# Patient Record
Sex: MALE | HISPANIC OR LATINO | URBAN - METROPOLITAN AREA
[De-identification: names, ages, dates, MRNs, and addresses within clinical notes are randomized per-mention and may not be internally consistent; named-entity substitution may affect disease eponyms.]

---

## 2016-07-29 RX ORDER — GLYBURIDE 5 MG
1 TABLET ORAL
Qty: 0 | Refills: 0 | COMMUNITY
Start: 2016-07-29

## 2017-03-01 VITALS
TEMPERATURE: 98 F | OXYGEN SATURATION: 95 % | DIASTOLIC BLOOD PRESSURE: 67 MMHG | HEIGHT: 68 IN | WEIGHT: 240.08 LBS | RESPIRATION RATE: 16 BRPM | HEART RATE: 72 BPM | SYSTOLIC BLOOD PRESSURE: 148 MMHG

## 2017-03-01 NOTE — PRE-OP CHECKLIST - SELECT TESTS ORDERED
nares dos/EKG/CXR/PT/PTT/CBC/CMP/Urinalysis sravanthi dos, t& cross dos/Urinalysis/EKG/CMP/PT/PTT/CXR/CBC

## 2017-03-01 NOTE — H&P ADULT - NSHPPHYSICALEXAM_GEN_ALL_CORE
MSK  +Decreased ROM secondary to pain, left knee    Remainder of exam per medical clearance note MSK:  +Decreased ROM secondary to pain, left knee  Quad/GS/TA/FHL/EHL motor strength 5/5 bilaterally  Sensation intact and equal to distal bilateral LE.   DP pulses palpable, toes warm and well perfused, cap refill brisk.     Remainder of exam per medical clearance note

## 2017-03-01 NOTE — PATIENT PROFILE ADULT. - PSH
H/O arthroscopy of right knee  2014  S/P arthroscopic surgery of left knee  2014  S/P hemorrhoidectomy  1980  S/P laparoscopic cholecystectomy  5/13/2015 H/O arthroscopy of right knee  2014  H/O total knee replacement, right  7/2016  S/P arthroscopic surgery of left knee  2014  S/P hemorrhoidectomy  1980  S/P laparoscopic cholecystectomy  5/13/2015

## 2017-03-01 NOTE — PATIENT PROFILE ADULT. - TEACHING/LEARNING LEARNING PREFERENCES
individual instruction/verbal instruction verbal instruction/written material/individual instruction

## 2017-03-01 NOTE — H&P ADULT - PROBLEM SELECTOR PLAN 1
Admit to Orthopaedic Service.  Presents today for elective left TKR  Pt medically stable and cleared for procedure today by Dr. Sargent

## 2017-03-01 NOTE — H&P ADULT - NSHPLABSRESULTS_GEN_ALL_CORE
Preop CBC, BMP, PT/PTT/INR, UA - WNL per medical clearance   Nasal Swab DOS  Preop EKG - left axis deviation - WNL per medical clearance   Preop CXR - WNL per medical clearance   Nuclear Stress Test 7/12/16 Normal left ventricular contractility, normal SPECT, no evidence of myocardial ischemia Preop CBC, BMP, PT/PTT/INR, UA - WNL per medical clearance   Nasal Swab performed DOS  Preop EKG - left axis deviation - WNL per medical clearance   Preop CXR - WNL per medical clearance   Nuclear Stress Test 7/12/16 Normal left ventricular contractility, normal SPECT, no evidence of myocardial ischemia

## 2017-03-01 NOTE — H&P ADULT - HISTORY OF PRESENT ILLNESS
66M c/o left knee pain x       Elective left total knee replacement today 66M c/o left knee pain x 5 years s/p mechanical fall at work in 2012. Pt's pain has progressively worsened without improvement and has become increasingly unresponsive to past conservative management including physical therapy, cortisone injection (x1) and arthroscopic surgery. Pain is non-radiating. Pt ambulates with the use of a cane s/p right TKR in July 2016. Denies numbness/tingling/weakness of extremities. Denies fever, chills, CP, SOB, N/V today.     Elective left total knee replacement today 66M c/o left knee pain x 5 years s/p mechanical fall at work in 2012. Pt's pain has progressively worsened without improvement and has become increasingly unresponsive to past conservative management including physical therapy, cortisone injection (x1) and arthroscopic surgery. Pain is non-radiating. Pt ambulates with the use of a cane s/p right TKR in July 2016. Denies numbness/tingling/weakness of extremities. Denies h/o DVT. Denies fever, chills, CP, SOB, N/V today.     Elective left total knee replacement today

## 2017-03-02 ENCOUNTER — INPATIENT (INPATIENT)
Facility: HOSPITAL | Age: 67
LOS: 3 days | Discharge: HOME HEALTH | DRG: 470 | End: 2017-03-06
Attending: ORTHOPAEDIC SURGERY | Admitting: ORTHOPAEDIC SURGERY
Payer: OTHER MISCELLANEOUS

## 2017-03-02 DIAGNOSIS — Z96.651 PRESENCE OF RIGHT ARTIFICIAL KNEE JOINT: Chronic | ICD-10-CM

## 2017-03-02 DIAGNOSIS — Z98.89 OTHER SPECIFIED POSTPROCEDURAL STATES: Chronic | ICD-10-CM

## 2017-03-02 DIAGNOSIS — M17.12 UNILATERAL PRIMARY OSTEOARTHRITIS, LEFT KNEE: ICD-10-CM

## 2017-03-02 DIAGNOSIS — Z90.49 ACQUIRED ABSENCE OF OTHER SPECIFIED PARTS OF DIGESTIVE TRACT: Chronic | ICD-10-CM

## 2017-03-02 LAB
ANION GAP SERPL CALC-SCNC: 7 MMOL/L — LOW (ref 9–16)
BUN SERPL-MCNC: 13 MG/DL — SIGNIFICANT CHANGE UP (ref 7–23)
CALCIUM SERPL-MCNC: 8.5 MG/DL — SIGNIFICANT CHANGE UP (ref 8.5–10.5)
CHLORIDE SERPL-SCNC: 108 MMOL/L — SIGNIFICANT CHANGE UP (ref 96–108)
CO2 SERPL-SCNC: 25 MMOL/L — SIGNIFICANT CHANGE UP (ref 22–31)
CREAT SERPL-MCNC: 0.72 MG/DL — SIGNIFICANT CHANGE UP (ref 0.5–1.3)
GLUCOSE SERPL-MCNC: 195 MG/DL — HIGH (ref 70–99)
HCT VFR BLD CALC: 39.8 % — SIGNIFICANT CHANGE UP (ref 39–50)
HGB BLD-MCNC: 13.6 G/DL — SIGNIFICANT CHANGE UP (ref 13–17)
MCHC RBC-ENTMCNC: 28.3 PG — SIGNIFICANT CHANGE UP (ref 27–34)
MCHC RBC-ENTMCNC: 34.2 G/DL — SIGNIFICANT CHANGE UP (ref 32–36)
MCV RBC AUTO: 82.9 FL — SIGNIFICANT CHANGE UP (ref 80–100)
PLATELET # BLD AUTO: 177 K/UL — SIGNIFICANT CHANGE UP (ref 150–400)
POTASSIUM SERPL-MCNC: 4.1 MMOL/L — SIGNIFICANT CHANGE UP (ref 3.5–5.3)
POTASSIUM SERPL-SCNC: 4.1 MMOL/L — SIGNIFICANT CHANGE UP (ref 3.5–5.3)
RBC # BLD: 4.8 M/UL — SIGNIFICANT CHANGE UP (ref 4.2–5.8)
RBC # FLD: 13.1 % — SIGNIFICANT CHANGE UP (ref 10.3–16.9)
SODIUM SERPL-SCNC: 140 MMOL/L — SIGNIFICANT CHANGE UP (ref 135–145)
WBC # BLD: 7.4 K/UL — SIGNIFICANT CHANGE UP (ref 3.8–10.5)
WBC # FLD AUTO: 7.4 K/UL — SIGNIFICANT CHANGE UP (ref 3.8–10.5)

## 2017-03-02 PROCEDURE — 27447 TOTAL KNEE ARTHROPLASTY: CPT | Mod: AS

## 2017-03-02 RX ORDER — CELECOXIB 200 MG/1
200 CAPSULE ORAL
Qty: 0 | Refills: 0 | Status: DISCONTINUED | OUTPATIENT
Start: 2017-03-04 | End: 2017-03-06

## 2017-03-02 RX ORDER — DEXTROSE 50 % IN WATER 50 %
25 SYRINGE (ML) INTRAVENOUS ONCE
Qty: 0 | Refills: 0 | Status: DISCONTINUED | OUTPATIENT
Start: 2017-03-02 | End: 2017-03-06

## 2017-03-02 RX ORDER — FERROUS SULFATE 325(65) MG
325 TABLET ORAL
Qty: 0 | Refills: 0 | Status: DISCONTINUED | OUTPATIENT
Start: 2017-03-02 | End: 2017-03-06

## 2017-03-02 RX ORDER — ASCORBIC ACID 60 MG
500 TABLET,CHEWABLE ORAL
Qty: 0 | Refills: 0 | Status: DISCONTINUED | OUTPATIENT
Start: 2017-03-02 | End: 2017-03-06

## 2017-03-02 RX ORDER — SENNA PLUS 8.6 MG/1
2 TABLET ORAL AT BEDTIME
Qty: 0 | Refills: 0 | Status: DISCONTINUED | OUTPATIENT
Start: 2017-03-02 | End: 2017-03-03

## 2017-03-02 RX ORDER — OXYCODONE HYDROCHLORIDE 5 MG/1
5 TABLET ORAL EVERY 4 HOURS
Qty: 0 | Refills: 0 | Status: DISCONTINUED | OUTPATIENT
Start: 2017-03-02 | End: 2017-03-06

## 2017-03-02 RX ORDER — FOLIC ACID 0.8 MG
1 TABLET ORAL DAILY
Qty: 0 | Refills: 0 | Status: DISCONTINUED | OUTPATIENT
Start: 2017-03-02 | End: 2017-03-06

## 2017-03-02 RX ORDER — DEXTROSE 50 % IN WATER 50 %
25 SYRINGE (ML) INTRAVENOUS ONCE
Qty: 0 | Refills: 0 | Status: DISCONTINUED | OUTPATIENT
Start: 2017-03-02 | End: 2017-03-04

## 2017-03-02 RX ORDER — ASPIRIN/CALCIUM CARB/MAGNESIUM 324 MG
325 TABLET ORAL
Qty: 0 | Refills: 0 | Status: DISCONTINUED | OUTPATIENT
Start: 2017-03-02 | End: 2017-03-06

## 2017-03-02 RX ORDER — INSULIN LISPRO 100/ML
3 VIAL (ML) SUBCUTANEOUS ONCE
Qty: 0 | Refills: 0 | Status: DISCONTINUED | OUTPATIENT
Start: 2017-03-02 | End: 2017-03-02

## 2017-03-02 RX ORDER — SODIUM CHLORIDE 9 MG/ML
1000 INJECTION, SOLUTION INTRAVENOUS
Qty: 0 | Refills: 0 | Status: DISCONTINUED | OUTPATIENT
Start: 2017-03-02 | End: 2017-03-04

## 2017-03-02 RX ORDER — ONDANSETRON 8 MG/1
4 TABLET, FILM COATED ORAL EVERY 6 HOURS
Qty: 0 | Refills: 0 | Status: DISCONTINUED | OUTPATIENT
Start: 2017-03-02 | End: 2017-03-06

## 2017-03-02 RX ORDER — LOSARTAN POTASSIUM 100 MG/1
100 TABLET, FILM COATED ORAL DAILY
Qty: 0 | Refills: 0 | Status: DISCONTINUED | OUTPATIENT
Start: 2017-03-02 | End: 2017-03-06

## 2017-03-02 RX ORDER — MORPHINE SULFATE 50 MG/1
4 CAPSULE, EXTENDED RELEASE ORAL
Qty: 0 | Refills: 0 | Status: DISCONTINUED | OUTPATIENT
Start: 2017-03-02 | End: 2017-03-03

## 2017-03-02 RX ORDER — SIMVASTATIN 20 MG/1
5 TABLET, FILM COATED ORAL AT BEDTIME
Qty: 0 | Refills: 0 | Status: DISCONTINUED | OUTPATIENT
Start: 2017-03-02 | End: 2017-03-06

## 2017-03-02 RX ORDER — DEXTROSE 50 % IN WATER 50 %
1 SYRINGE (ML) INTRAVENOUS ONCE
Qty: 0 | Refills: 0 | Status: DISCONTINUED | OUTPATIENT
Start: 2017-03-02 | End: 2017-03-04

## 2017-03-02 RX ORDER — DOCUSATE SODIUM 100 MG
100 CAPSULE ORAL THREE TIMES A DAY
Qty: 0 | Refills: 0 | Status: DISCONTINUED | OUTPATIENT
Start: 2017-03-02 | End: 2017-03-06

## 2017-03-02 RX ORDER — INSULIN LISPRO 100/ML
VIAL (ML) SUBCUTANEOUS
Qty: 0 | Refills: 0 | Status: DISCONTINUED | OUTPATIENT
Start: 2017-03-02 | End: 2017-03-04

## 2017-03-02 RX ORDER — MAGNESIUM HYDROXIDE 400 MG/1
30 TABLET, CHEWABLE ORAL DAILY
Qty: 0 | Refills: 0 | Status: DISCONTINUED | OUTPATIENT
Start: 2017-03-02 | End: 2017-03-06

## 2017-03-02 RX ORDER — SODIUM CHLORIDE 9 MG/ML
1000 INJECTION, SOLUTION INTRAVENOUS
Qty: 0 | Refills: 0 | Status: DISCONTINUED | OUTPATIENT
Start: 2017-03-02 | End: 2017-03-03

## 2017-03-02 RX ORDER — MORPHINE SULFATE 50 MG/1
4 CAPSULE, EXTENDED RELEASE ORAL EVERY 4 HOURS
Qty: 0 | Refills: 0 | Status: DISCONTINUED | OUTPATIENT
Start: 2017-03-02 | End: 2017-03-06

## 2017-03-02 RX ORDER — INSULIN HUMAN 100 [IU]/ML
3 INJECTION, SOLUTION SUBCUTANEOUS ONCE
Qty: 0 | Refills: 0 | Status: DISCONTINUED | OUTPATIENT
Start: 2017-03-02 | End: 2017-03-02

## 2017-03-02 RX ORDER — CEFAZOLIN SODIUM 1 G
1000 VIAL (EA) INJECTION EVERY 8 HOURS
Qty: 0 | Refills: 0 | Status: COMPLETED | OUTPATIENT
Start: 2017-03-02 | End: 2017-03-02

## 2017-03-02 RX ORDER — ACETAMINOPHEN 500 MG
650 TABLET ORAL EVERY 6 HOURS
Qty: 0 | Refills: 0 | Status: DISCONTINUED | OUTPATIENT
Start: 2017-03-02 | End: 2017-03-06

## 2017-03-02 RX ORDER — INSULIN LISPRO 100/ML
VIAL (ML) SUBCUTANEOUS AT BEDTIME
Qty: 0 | Refills: 0 | Status: DISCONTINUED | OUTPATIENT
Start: 2017-03-02 | End: 2017-03-03

## 2017-03-02 RX ORDER — BUPIVACAINE 13.3 MG/ML
20 INJECTION, SUSPENSION, LIPOSOMAL INFILTRATION ONCE
Qty: 0 | Refills: 0 | Status: DISCONTINUED | OUTPATIENT
Start: 2017-03-02 | End: 2017-03-06

## 2017-03-02 RX ORDER — DEXTROSE 50 % IN WATER 50 %
12.5 SYRINGE (ML) INTRAVENOUS ONCE
Qty: 0 | Refills: 0 | Status: DISCONTINUED | OUTPATIENT
Start: 2017-03-02 | End: 2017-03-04

## 2017-03-02 RX ORDER — GLUCAGON INJECTION, SOLUTION 0.5 MG/.1ML
1 INJECTION, SOLUTION SUBCUTANEOUS ONCE
Qty: 0 | Refills: 0 | Status: DISCONTINUED | OUTPATIENT
Start: 2017-03-02 | End: 2017-03-04

## 2017-03-02 RX ORDER — POLYETHYLENE GLYCOL 3350 17 G/17G
17 POWDER, FOR SOLUTION ORAL DAILY
Qty: 0 | Refills: 0 | Status: DISCONTINUED | OUTPATIENT
Start: 2017-03-02 | End: 2017-03-06

## 2017-03-02 RX ORDER — OXYCODONE HYDROCHLORIDE 5 MG/1
10 TABLET ORAL EVERY 6 HOURS
Qty: 0 | Refills: 0 | Status: DISCONTINUED | OUTPATIENT
Start: 2017-03-02 | End: 2017-03-03

## 2017-03-02 RX ADMIN — OXYCODONE HYDROCHLORIDE 5 MILLIGRAM(S): 5 TABLET ORAL at 23:16

## 2017-03-02 RX ADMIN — SIMVASTATIN 5 MILLIGRAM(S): 20 TABLET, FILM COATED ORAL at 21:56

## 2017-03-02 RX ADMIN — Medication 100 MILLIGRAM(S): at 23:16

## 2017-03-02 RX ADMIN — Medication 1 TABLET(S): at 17:40

## 2017-03-02 RX ADMIN — Medication 1 MILLIGRAM(S): at 17:40

## 2017-03-02 RX ADMIN — MORPHINE SULFATE 4 MILLIGRAM(S): 50 CAPSULE, EXTENDED RELEASE ORAL at 13:30

## 2017-03-02 RX ADMIN — Medication 100 MILLIGRAM(S): at 16:09

## 2017-03-02 RX ADMIN — Medication: at 12:16

## 2017-03-02 RX ADMIN — SODIUM CHLORIDE 80 MILLILITER(S): 9 INJECTION, SOLUTION INTRAVENOUS at 23:16

## 2017-03-02 RX ADMIN — OXYCODONE HYDROCHLORIDE 10 MILLIGRAM(S): 5 TABLET ORAL at 17:00

## 2017-03-02 RX ADMIN — MORPHINE SULFATE 4 MILLIGRAM(S): 50 CAPSULE, EXTENDED RELEASE ORAL at 14:15

## 2017-03-02 RX ADMIN — Medication 500 MILLIGRAM(S): at 17:37

## 2017-03-02 RX ADMIN — MORPHINE SULFATE 4 MILLIGRAM(S): 50 CAPSULE, EXTENDED RELEASE ORAL at 13:14

## 2017-03-02 RX ADMIN — MORPHINE SULFATE 4 MILLIGRAM(S): 50 CAPSULE, EXTENDED RELEASE ORAL at 14:30

## 2017-03-02 RX ADMIN — Medication 1 TABLET(S): at 23:16

## 2017-03-02 RX ADMIN — MORPHINE SULFATE 4 MILLIGRAM(S): 50 CAPSULE, EXTENDED RELEASE ORAL at 18:00

## 2017-03-02 RX ADMIN — OXYCODONE HYDROCHLORIDE 10 MILLIGRAM(S): 5 TABLET ORAL at 16:09

## 2017-03-02 RX ADMIN — Medication 1: at 17:38

## 2017-03-02 RX ADMIN — MORPHINE SULFATE 4 MILLIGRAM(S): 50 CAPSULE, EXTENDED RELEASE ORAL at 17:37

## 2017-03-02 RX ADMIN — Medication 325 MILLIGRAM(S): at 17:41

## 2017-03-02 RX ADMIN — Medication 100 MILLIGRAM(S): at 21:56

## 2017-03-02 NOTE — PHYSICAL THERAPY INITIAL EVALUATION ADULT - PERTINENT HX OF CURRENT PROBLEM, REHAB EVAL
Patient is a 67 y/o M with chief c/o of chronic knee pain progressively worsening x 5 years following work related injury presenting for elective TKR.

## 2017-03-02 NOTE — PHYSICAL THERAPY INITIAL EVALUATION ADULT - IMPAIRMENTS FOUND, PT EVAL
ROM/aerobic capacity/endurance/gait, locomotion, and balance/posture/ergonomics and body mechanics/muscle strength

## 2017-03-02 NOTE — PHYSICAL THERAPY INITIAL EVALUATION ADULT - PLANNED THERAPY INTERVENTIONS, PT EVAL
gait training/strengthening/ROM/bed mobility training/transfer training/balance training/postural re-education

## 2017-03-02 NOTE — PHYSICAL THERAPY INITIAL EVALUATION ADULT - CRITERIA FOR SKILLED THERAPEUTIC INTERVENTIONS
anticipated discharge recommendation/risk reduction/prevention/functional limitations in following categories/therapy frequency/impairments found/rehab potential/predicted duration of therapy intervention/anticipated equipment needs at discharge

## 2017-03-02 NOTE — PHYSICAL THERAPY INITIAL EVALUATION ADULT - RANGE OF MOTION EXAMINATION, REHAB EVAL
Right LE ROM was WNL (within normal limits)/bilateral upper extremity ROM was WNL (within normal limits)

## 2017-03-02 NOTE — PROGRESS NOTE ADULT - SUBJECTIVE AND OBJECTIVE BOX
Ortho Post Op Check    Procedure: Left total knee replacement  Surgeon: Dr. Sumner    Pt comfortable without complaints, pain well controlled  Denies CP, SOB, N/V, numbness/tingling of extremities    Vital Signs Last 24 Hrs  T(C): 36.8, Max: 36.8 (03-02 @ 13:00)  T(F): 98.2, Max: 98.2 (03-02 @ 13:00)  HR: 67 (55 - 67)  BP: 111/61 (98/61 - 115/63)  BP(mean): --  RR: 15 (11 - 22)  SpO2: 100% (96% - 100%)  Wt(kg): --  AVSS    General: Pt Alert and oriented, NAD  DSG C/D/I, icepack on wound  Pulses: DP pulses palpable, toes warm and well perfused, cap refill brisk  Sensation: sensation intact and equal to distal BLE  Motor: EHL/FHL/TA/GS 5/5 strength BLE          Post-op X-Ray: s/p left TKR, prosthesis in place    A/P: 66yMale POD#0 s/p left total knee replacement  - Stable  - Pain Control  - DVT ppx: ASA  - Post op abx: Ancef  - PT, WBS: WBAT    Ortho Pager [phone] Ortho Post Op Check    Procedure: Left total knee replacement  Surgeon: Dr. Sumner    Pt comfortable without complaints, pain well controlled  Denies CP, SOB, N/V, numbness/tingling of extremities    Vital Signs Last 24 Hrs  T(C): 36.8, Max: 36.8 (03-02 @ 13:00)  T(F): 98.2, Max: 98.2 (03-02 @ 13:00)  HR: 67 (55 - 67)  BP: 111/61 (98/61 - 115/63)  BP(mean): --  RR: 15 (11 - 22)  SpO2: 100% (96% - 100%)  Wt(kg): --  AVSS    General: Pt Alert and oriented, NAD  DSG C/D/I, cryocuff in place  Pulses: DP pulses palpable, toes warm and well perfused, cap refill brisk  Sensation: sensation intact and equal to distal BLE  Motor: EHL/FHL/TA/GS 5/5 strength BLE          Post-op X-Ray: s/p left TKR, prosthesis in place    A/P: 66yMale POD#0 s/p left total knee replacement  - Stable  - Pain Control  - DVT ppx: ASA  - Post op abx: Ancef  - PT, WBS: WBAT    Ortho Pager [phone]

## 2017-03-03 DIAGNOSIS — I10 ESSENTIAL (PRIMARY) HYPERTENSION: ICD-10-CM

## 2017-03-03 DIAGNOSIS — E87.1 HYPO-OSMOLALITY AND HYPONATREMIA: ICD-10-CM

## 2017-03-03 DIAGNOSIS — E11.9 TYPE 2 DIABETES MELLITUS WITHOUT COMPLICATIONS: ICD-10-CM

## 2017-03-03 DIAGNOSIS — M19.90 UNSPECIFIED OSTEOARTHRITIS, UNSPECIFIED SITE: ICD-10-CM

## 2017-03-03 DIAGNOSIS — Z98.890 OTHER SPECIFIED POSTPROCEDURAL STATES: ICD-10-CM

## 2017-03-03 DIAGNOSIS — M17.0 BILATERAL PRIMARY OSTEOARTHRITIS OF KNEE: ICD-10-CM

## 2017-03-03 LAB
ANION GAP SERPL CALC-SCNC: 8 MMOL/L — LOW (ref 9–16)
ANION GAP SERPL CALC-SCNC: 8 MMOL/L — LOW (ref 9–16)
BUN SERPL-MCNC: 10 MG/DL — SIGNIFICANT CHANGE UP (ref 7–23)
BUN SERPL-MCNC: 13 MG/DL — SIGNIFICANT CHANGE UP (ref 7–23)
CALCIUM SERPL-MCNC: 8.7 MG/DL — SIGNIFICANT CHANGE UP (ref 8.5–10.5)
CALCIUM SERPL-MCNC: 9.2 MG/DL — SIGNIFICANT CHANGE UP (ref 8.5–10.5)
CHLORIDE SERPL-SCNC: 91 MMOL/L — LOW (ref 96–108)
CHLORIDE SERPL-SCNC: 97 MMOL/L — SIGNIFICANT CHANGE UP (ref 96–108)
CO2 SERPL-SCNC: 25 MMOL/L — SIGNIFICANT CHANGE UP (ref 22–31)
CO2 SERPL-SCNC: 28 MMOL/L — SIGNIFICANT CHANGE UP (ref 22–31)
CREAT SERPL-MCNC: 0.65 MG/DL — SIGNIFICANT CHANGE UP (ref 0.5–1.3)
CREAT SERPL-MCNC: 0.84 MG/DL — SIGNIFICANT CHANGE UP (ref 0.5–1.3)
GLUCOSE SERPL-MCNC: 233 MG/DL — HIGH (ref 70–99)
GLUCOSE SERPL-MCNC: 317 MG/DL — HIGH (ref 70–99)
HBA1C BLD-MCNC: 9.1 % — HIGH (ref 4.8–5.6)
HCT VFR BLD CALC: 40.9 % — SIGNIFICANT CHANGE UP (ref 39–50)
HGB BLD-MCNC: 13.9 G/DL — SIGNIFICANT CHANGE UP (ref 13–17)
MCHC RBC-ENTMCNC: 27.8 PG — SIGNIFICANT CHANGE UP (ref 27–34)
MCHC RBC-ENTMCNC: 34 G/DL — SIGNIFICANT CHANGE UP (ref 32–36)
MCV RBC AUTO: 81.8 FL — SIGNIFICANT CHANGE UP (ref 80–100)
MRSA PCR RESULT.: NEGATIVE — SIGNIFICANT CHANGE UP
PLATELET # BLD AUTO: 199 K/UL — SIGNIFICANT CHANGE UP (ref 150–400)
POTASSIUM SERPL-MCNC: 3.8 MMOL/L — SIGNIFICANT CHANGE UP (ref 3.5–5.3)
POTASSIUM SERPL-MCNC: 4 MMOL/L — SIGNIFICANT CHANGE UP (ref 3.5–5.3)
POTASSIUM SERPL-SCNC: 3.8 MMOL/L — SIGNIFICANT CHANGE UP (ref 3.5–5.3)
POTASSIUM SERPL-SCNC: 4 MMOL/L — SIGNIFICANT CHANGE UP (ref 3.5–5.3)
RBC # BLD: 5 M/UL — SIGNIFICANT CHANGE UP (ref 4.2–5.8)
RBC # FLD: 13 % — SIGNIFICANT CHANGE UP (ref 10.3–16.9)
S AUREUS DNA NOSE QL NAA+PROBE: NEGATIVE — SIGNIFICANT CHANGE UP
SODIUM SERPL-SCNC: 127 MMOL/L — LOW (ref 135–145)
SODIUM SERPL-SCNC: 130 MMOL/L — LOW (ref 135–145)
SURGICAL PATHOLOGY STUDY: SIGNIFICANT CHANGE UP
WBC # BLD: 11.5 K/UL — HIGH (ref 3.8–10.5)
WBC # FLD AUTO: 11.5 K/UL — HIGH (ref 3.8–10.5)

## 2017-03-03 RX ORDER — OXYCODONE HYDROCHLORIDE 5 MG/1
10 TABLET ORAL EVERY 4 HOURS
Qty: 0 | Refills: 0 | Status: DISCONTINUED | OUTPATIENT
Start: 2017-03-03 | End: 2017-03-06

## 2017-03-03 RX ORDER — SENNA PLUS 8.6 MG/1
2 TABLET ORAL AT BEDTIME
Qty: 0 | Refills: 0 | Status: DISCONTINUED | OUTPATIENT
Start: 2017-03-03 | End: 2017-03-06

## 2017-03-03 RX ORDER — MORPHINE SULFATE 50 MG/1
15 CAPSULE, EXTENDED RELEASE ORAL EVERY 12 HOURS
Qty: 0 | Refills: 0 | Status: DISCONTINUED | OUTPATIENT
Start: 2017-03-03 | End: 2017-03-06

## 2017-03-03 RX ORDER — INSULIN LISPRO 100/ML
VIAL (ML) SUBCUTANEOUS AT BEDTIME
Qty: 0 | Refills: 0 | Status: DISCONTINUED | OUTPATIENT
Start: 2017-03-03 | End: 2017-03-04

## 2017-03-03 RX ORDER — INSULIN GLARGINE 100 [IU]/ML
7 INJECTION, SOLUTION SUBCUTANEOUS AT BEDTIME
Qty: 0 | Refills: 0 | Status: DISCONTINUED | OUTPATIENT
Start: 2017-03-03 | End: 2017-03-04

## 2017-03-03 RX ORDER — KETOROLAC TROMETHAMINE 30 MG/ML
30 SYRINGE (ML) INJECTION EVERY 6 HOURS
Qty: 0 | Refills: 0 | Status: DISCONTINUED | OUTPATIENT
Start: 2017-03-03 | End: 2017-03-06

## 2017-03-03 RX ADMIN — OXYCODONE HYDROCHLORIDE 10 MILLIGRAM(S): 5 TABLET ORAL at 11:10

## 2017-03-03 RX ADMIN — MORPHINE SULFATE 4 MILLIGRAM(S): 50 CAPSULE, EXTENDED RELEASE ORAL at 06:01

## 2017-03-03 RX ADMIN — Medication 325 MILLIGRAM(S): at 12:28

## 2017-03-03 RX ADMIN — OXYCODONE HYDROCHLORIDE 10 MILLIGRAM(S): 5 TABLET ORAL at 14:08

## 2017-03-03 RX ADMIN — Medication 100 MILLIGRAM(S): at 05:53

## 2017-03-03 RX ADMIN — INSULIN GLARGINE 7 UNIT(S): 100 INJECTION, SOLUTION SUBCUTANEOUS at 23:27

## 2017-03-03 RX ADMIN — Medication 100 MILLIGRAM(S): at 12:28

## 2017-03-03 RX ADMIN — Medication 30 MILLIGRAM(S): at 17:05

## 2017-03-03 RX ADMIN — Medication 100 MILLIGRAM(S): at 23:26

## 2017-03-03 RX ADMIN — Medication 30 MILLIGRAM(S): at 17:40

## 2017-03-03 RX ADMIN — OXYCODONE HYDROCHLORIDE 10 MILLIGRAM(S): 5 TABLET ORAL at 05:00

## 2017-03-03 RX ADMIN — MORPHINE SULFATE 15 MILLIGRAM(S): 50 CAPSULE, EXTENDED RELEASE ORAL at 17:54

## 2017-03-03 RX ADMIN — Medication 5: at 17:06

## 2017-03-03 RX ADMIN — Medication 500 MILLIGRAM(S): at 05:54

## 2017-03-03 RX ADMIN — Medication 1 TABLET(S): at 23:26

## 2017-03-03 RX ADMIN — Medication 1 TABLET(S): at 05:54

## 2017-03-03 RX ADMIN — Medication 325 MILLIGRAM(S): at 17:08

## 2017-03-03 RX ADMIN — OXYCODONE HYDROCHLORIDE 10 MILLIGRAM(S): 5 TABLET ORAL at 10:11

## 2017-03-03 RX ADMIN — Medication 1 MILLIGRAM(S): at 12:27

## 2017-03-03 RX ADMIN — SIMVASTATIN 5 MILLIGRAM(S): 20 TABLET, FILM COATED ORAL at 23:28

## 2017-03-03 RX ADMIN — Medication 1: at 23:27

## 2017-03-03 RX ADMIN — Medication 500 MILLIGRAM(S): at 17:08

## 2017-03-03 RX ADMIN — Medication 3: at 12:27

## 2017-03-03 RX ADMIN — Medication 1 TABLET(S): at 12:27

## 2017-03-03 RX ADMIN — Medication 325 MILLIGRAM(S): at 17:07

## 2017-03-03 RX ADMIN — Medication 3: at 07:45

## 2017-03-03 RX ADMIN — LOSARTAN POTASSIUM 100 MILLIGRAM(S): 100 TABLET, FILM COATED ORAL at 05:54

## 2017-03-03 RX ADMIN — SENNA PLUS 2 TABLET(S): 8.6 TABLET ORAL at 23:27

## 2017-03-03 RX ADMIN — Medication 325 MILLIGRAM(S): at 05:54

## 2017-03-03 RX ADMIN — POLYETHYLENE GLYCOL 3350 17 GRAM(S): 17 POWDER, FOR SOLUTION ORAL at 12:27

## 2017-03-03 RX ADMIN — MORPHINE SULFATE 15 MILLIGRAM(S): 50 CAPSULE, EXTENDED RELEASE ORAL at 17:08

## 2017-03-03 RX ADMIN — OXYCODONE HYDROCHLORIDE 5 MILLIGRAM(S): 5 TABLET ORAL at 00:00

## 2017-03-03 RX ADMIN — Medication 5 MILLIGRAM(S): at 17:07

## 2017-03-03 RX ADMIN — OXYCODONE HYDROCHLORIDE 10 MILLIGRAM(S): 5 TABLET ORAL at 15:01

## 2017-03-03 RX ADMIN — MORPHINE SULFATE 4 MILLIGRAM(S): 50 CAPSULE, EXTENDED RELEASE ORAL at 06:30

## 2017-03-03 RX ADMIN — Medication 1 TABLET(S): at 12:28

## 2017-03-03 RX ADMIN — Medication 325 MILLIGRAM(S): at 08:58

## 2017-03-03 RX ADMIN — OXYCODONE HYDROCHLORIDE 10 MILLIGRAM(S): 5 TABLET ORAL at 04:13

## 2017-03-03 NOTE — CONSULT NOTE ADULT - ASSESSMENT
67yo M, PMH f HTN and NIDDM-2 and severe OA. Admitted for elective left TKA, now POD-1. Consulted for hyperglycemia and hyponatremia.

## 2017-03-03 NOTE — DISCHARGE NOTE ADULT - PATIENT PORTAL LINK FT
“You can access the [facility] Patient Portal, offered by [facility], by registering with the following website: [email or web address]”

## 2017-03-03 NOTE — DISCHARGE NOTE ADULT - CARE PLAN
Principal Discharge DX:	Primary osteoarthritis of left knee  Goal:	improvement after surgery  Instructions for follow-up, activity and diet:	see below

## 2017-03-03 NOTE — CONSULT NOTE ADULT - PROBLEM SELECTOR RECOMMENDATION 9
Per patient only on oral hypoglycemiants. Last A1c ~7 (seems well controlled).  Only required 9 units of correctional RAFFAELE in past 24h.  * Would c/w RAFFAELE for now.  * Might start a low dose Lantus, depending on today's insulin requirements.  * Back to oral hypoglycemiants when discharged.

## 2017-03-03 NOTE — CONSULT NOTE ADULT - SUBJECTIVE AND OBJECTIVE BOX
Patient c/o moderate left knee pain.  Otherwise no symptoms.  ROS negative.    Vital Signs Last 24 Hrs  T(C): 37.3, Max: 37.9 (03-02 @ 21:04)  T(F): 99.2, Max: 100.3 (03-02 @ 21:04)  HR: 98 (56 - 98)  BP: 139/67 (111/61 - 160/78)  BP(mean): --  RR: 16 (8 - 17)  SpO2: 95% (94% - 100%)    PHYSICAL EXAMINATION  * General: Not in acute distress. Awake and alert. Lying comfortably in bed.  * Head: Normocephalic, atraumatic.  * HEENT: ears no discharge, eyes PERRLA, nose no discharge, throat no exudates, normal tonsils.  * Neck: no JVD, supple.  * Lungs: Clear to auscultation, no rales, no wheezes.  * Cardio: Regular rate and rhythm, no murmurs, no rubs, no gallops. Good peripheral pulses.  * Abdomen: Soft, non-tender, non-distended, tympanic to percussion, no rebound, no guarding, no rigidity. Bowel sounds present. No suprapubic or CVA tenderness.  * : Deferred.  * Extremities: Acyanotic, no edema. Left knee with dressing.  * Skin: Warm and dry.  * Neuro: Alert and oriented x 3. No focal deficits. Motor strength is 5/5 throughout. Sensation intact. Cranial nerves II-XII grossly intact.                           13.9   11.5  )-----------( 199      ( 03 Mar 2017 08:04 )             40.9   03 Mar 2017 08:04    130    |  97     |  10     ----------------------------<  233    4.0     |  25     |  0.65     Ca    8.7        03 Mar 2017 08:04      MEDICATIONS  (STANDING):  aspirin enteric coated 325milliGRAM(s) Oral two times a day  BUpivacaine liposome 1.3% Injectable (no eMAR) 20milliLiter(s) Local Injection once  calcium carbonate 1250 mG + Vitamin D (OsCal 500 + D) 1Tablet(s) Oral three times a day  polyethylene glycol 3350 17Gram(s) Oral daily  docusate sodium 100milliGRAM(s) Oral three times a day  ferrous    sulfate 325milliGRAM(s) Oral three times a day with meals  folic acid 1milliGRAM(s) Oral daily  multivitamin 1Tablet(s) Oral daily  ascorbic acid 500milliGRAM(s) Oral two times a day  insulin lispro (HumaLOG) corrective regimen sliding scale  SubCutaneous three times a day before meals  dextrose 5%. 1000milliLiter(s) IV Continuous <Continuous>  dextrose 50% Injectable 12.5Gram(s) IV Push once  dextrose 50% Injectable 25Gram(s) IV Push once  dextrose 50% Injectable 25Gram(s) IV Push once  simvastatin 5milliGRAM(s) Oral at bedtime  hydrochlorothiazide   Tablet 25milliGRAM(s) Oral daily  losartan 100milliGRAM(s) Oral daily  senna 2Tablet(s) Oral at bedtime  bisacodyl 5milliGRAM(s) Oral every 12 hours    MEDICATIONS  (PRN):  acetaminophen   Tablet 650milliGRAM(s) Oral every 6 hours PRN For Temp over 38.3 C (100.94 F)  oxyCODONE IR 5milliGRAM(s) Oral every 4 hours PRN Moderate Pain (4 - 6)  oxyCODONE IR 10milliGRAM(s) Oral every 6 hours PRN Severe Pain (7 - 10)  morphine  - Injectable 4milliGRAM(s) IV Push every 4 hours PRN For breakthrough pain unrelieved by PO meds  morphine  - Injectable 4milliGRAM(s) IV Push every 15 minutes PRN For breakthrough pain unrelieved by PO meds  aluminum hydroxide/magnesium hydroxide/simethicone Suspension 30milliLiter(s) Oral four times a day PRN Indigestion  ondansetron Injectable 4milliGRAM(s) IV Push every 6 hours PRN Nausea and/or Vomiting  magnesium hydroxide Suspension 30milliLiter(s) Oral daily PRN Constipation unrelieved by other meds  dextrose Gel 1Dose(s) Oral once PRN Blood Glucose LESS THAN 70 milliGRAM(s)/deciliter  glucagon  Injectable 1milliGRAM(s) IntraMuscular once PRN Glucose LESS THAN 70 milligrams/deciliter

## 2017-03-03 NOTE — DISCHARGE NOTE ADULT - MEDICATION SUMMARY - MEDICATIONS TO TAKE
I will START or STAY ON the medications listed below when I get home from the hospital:    celecoxib 200 mg oral capsule  -- 1 cap(s) by mouth 2 times a day (before meals)  -- Indication: For Pain    oxyCODONE 5 mg oral tablet  -- 1 tab(s) by mouth every 4 hours, As needed, Moderate Pain (4 - 6)  -- Indication: For Pain    oxyCODONE 10 mg oral tablet  -- 1 tab(s) by mouth every 4 hours, As needed, Severe Pain (7 - 10)  -- Indication: For Pain    aspirin 325 mg oral delayed release tablet  -- 1 tab(s) by mouth 2 times a day  -- Indication: For dvt ppx    losartan 100 mg oral tablet  -- 1 tab(s) by mouth once a day  -- Indication: For Home med    Farxiga 10 mg oral tablet  -- 1 tab(s) by mouth once a day  -- Indication: For DM    metFORMIN 1000 mg oral tablet  -- 1 tab(s) by mouth 2 times a day  -- Indication: For DM    glyBURIDE 2.5 mg oral tablet  -- 1 tab(s) by mouth once a day  -- Indication: For Dm    simvastatin 5 mg oral tablet  -- 1 tab(s) by mouth once a day (at bedtime)  -- Indication: For HLD    hydrochlorothiazide-losartan 25 mg-100 mg oral tablet  -- 1 tab(s) by mouth once a day  -- Indication: For HTn    hydroCHLOROthiazide 25 mg oral tablet  -- 1 tab(s) by mouth once a day  -- Indication: For HTN I will START or STAY ON the medications listed below when I get home from the hospital:    aspirin 325 mg oral delayed release tablet  -- 1 tab(s) by mouth 2 times a day  -- Indication: For dvt ppx    meloxicam 15 mg oral tablet  -- 1 tab(s) by mouth once a day  -- Do not take this drug if you are pregnant.  Obtain medical advice before taking any non-prescription drugs as some may affect the action of this medication.  Take with food or milk.    -- Indication: For Pain    acetaminophen-oxyCODONE 325 mg-5 mg oral tablet  -- 1 tab(s) by mouth every 6 hours, As Needed -for moderate pain MDD:6  -- Caution federal law prohibits the transfer of this drug to any person other  than the person for whom it was prescribed.  May cause drowsiness.  Alcohol may intensify this effect.  Use care when operating dangerous machinery.  This prescription cannot be refilled.  This product contains acetaminophen.  Do not use  with any other product containing acetaminophen to prevent possible liver damage.  Using more of this medication than prescribed may cause serious breathing problems.    -- Indication: For Pain    losartan 100 mg oral tablet  -- 1 tab(s) by mouth once a day  -- Indication: For Home med    Farxiga 10 mg oral tablet  -- 1 tab(s) by mouth once a day  -- Indication: For DM    metFORMIN 1000 mg oral tablet  -- 1 tab(s) by mouth 2 times a day  -- Indication: For DM    glyBURIDE 2.5 mg oral tablet  -- 1 tab(s) by mouth once a day  -- Indication: For Dm    simvastatin 5 mg oral tablet  -- 1 tab(s) by mouth once a day (at bedtime)  -- Indication: For HLD    hydrochlorothiazide-losartan 25 mg-100 mg oral tablet  -- 1 tab(s) by mouth once a day  -- Indication: For HTn    hydroCHLOROthiazide 25 mg oral tablet  -- 1 tab(s) by mouth once a day  -- Indication: For HTN

## 2017-03-03 NOTE — PROGRESS NOTE ADULT - SUBJECTIVE AND OBJECTIVE BOX
ORTHO NOTE    [ ] Pt seen/examined.  [ ] Pt without any complaints/in NAD.    [X ] Pt complains of:  incisional pain - meds help a little      ROS: [ ] Fever  [ ] Chills  [ ] CP [ ] SOB [ ] Dysnea  [ ] Palpitations [ ] Cough [ ] N/V/C/D [ ] Paresthia [ ] Other     [X ] ROS  otherwise negative    .    PHYSICAL EXAM:    Vital Signs Last 24 Hrs  T(C): 37.3, Max: 37.9 (03-02 @ 21:04)  T(F): 99.2, Max: 100.3 (03-02 @ 21:04)  HR: 98 (55 - 98)  BP: 139/67 (102/60 - 160/78)  BP(mean): --  RR: 16 (8 - 17)  SpO2: 95% (94% - 100%)    I&O's Detail  I & Os for 24h ending 03 Mar 2017 07:00  =============================================  IN:    lactated ringers.: 1040 ml    Oral Fluid: 720 ml    Solution: 100 ml    Total IN: 1860 ml  ---------------------------------------------  OUT:    Voided: 1810 ml    Total OUT: 1810 ml  ---------------------------------------------  Total NET: 50 ml    I & Os for current day (as of 03 Mar 2017 11:44)  =============================================  IN:    Total IN: 0 ml  ---------------------------------------------  OUT:    Voided: 550 ml    Total OUT: 550 ml  ---------------------------------------------  Total NET: -550 ml       CAPILLARY BLOOD GLUCOSE  265 (03 Mar 2017 07:14)  205 (02 Mar 2017 22:53)  167 (02 Mar 2017 16:25)                  Neuro:  NAD A and O x 3    Lungs:    CV:    ABD: softly distended non tender + BS    Ext:  L knee dsg c/d/i strength 5/5 nvid    LABS                        13.9   11.5  )-----------( 199      ( 03 Mar 2017 08:04 )             40.9                                03 Mar 2017 08:04    130    |  97     |  10     ----------------------------<  233    4.0     |  25     |  0.65     Ca    8.7        03 Mar 2017 08:04        [ ] Other Labs  [ ] None ordered            Please check or False Pass when present:  •  Heart Failure:    [ ] Acute        [ ]  Acute on Chronic        [ ] Chronic         [ ] Diastolic     [ ]  Combined    •  CAIO:     [ ] ATN        [ ]  Renal medullary necrosis       [ ]  Renal cortical necrosis                  [ ] Other pathological Lesion:  •  CKD:  [ ] Stage I   [ ] Stage II  [ ] Stage III    [ ]Stage IV   [ ]  CKD V   [ ]  Other/Unspecified:    •  Abdominal Nutritional Status:   [ ] Malnutrition-See Nutrition note    [ ] Cachexia   [ ]  Other        [ ] Supplement ordered:            [ ] Morbid Obesity: BMI >=40         ASSESSMENT/PLAN:      STATUS POST: L TKA pod 1    CONTINUE:          [ ] PT wbat    [ ] DVT PPX- ASA BID    [ ] Pain Mgt con't current regimen    [ ] Dispo plan- home with home PT    Monitor Na, f/u repeat, may need fluid restriction.  d/c IVF.  Monitor FS, con't ISS, will assess if need to add additional insulin coverage.  Dr Mckenzie for medicine.  IS use.  Bowel regimen. ORTHO NOTE    [ ] Pt seen/examined.  [ ] Pt without any complaints/in NAD.    [X ] Pt complains of:  incisional pain - meds help a little      ROS: [ ] Fever  [ ] Chills  [ ] CP [ ] SOB [ ] Dysnea  [ ] Palpitations [ ] Cough [ ] N/V/C/D [ ] Paresthia [ ] Other     [X ] ROS  otherwise negative    .    PHYSICAL EXAM:    Vital Signs Last 24 Hrs  T(C): 37.3, Max: 37.9 (03-02 @ 21:04)  T(F): 99.2, Max: 100.3 (03-02 @ 21:04)  HR: 98 (55 - 98)  BP: 139/67 (102/60 - 160/78)  BP(mean): --  RR: 16 (8 - 17)  SpO2: 95% (94% - 100%)    I&O's Detail  I & Os for 24h ending 03 Mar 2017 07:00  =============================================  IN:    lactated ringers.: 1040 ml    Oral Fluid: 720 ml    Solution: 100 ml    Total IN: 1860 ml  ---------------------------------------------  OUT:    Voided: 1810 ml    Total OUT: 1810 ml  ---------------------------------------------  Total NET: 50 ml    I & Os for current day (as of 03 Mar 2017 11:44)  =============================================  IN:    Total IN: 0 ml  ---------------------------------------------  OUT:    Voided: 550 ml    Total OUT: 550 ml  ---------------------------------------------  Total NET: -550 ml       CAPILLARY BLOOD GLUCOSE  265 (03 Mar 2017 07:14)  205 (02 Mar 2017 22:53)  167 (02 Mar 2017 16:25)                  Neuro:  NAD A and O x 3    Lungs:    CV:    ABD: softly distended non tender + BS    Ext:  L knee dsg c/d/i strength 5/5 nvid    LABS                        13.9   11.5  )-----------( 199      ( 03 Mar 2017 08:04 )             40.9                                03 Mar 2017 08:04    130    |  97     |  10     ----------------------------<  233    4.0     |  25     |  0.65     Ca    8.7        03 Mar 2017 08:04        [ ] Other Labs  [ ] None ordered            Please check or [place] when present:  •  Heart Failure:    [ ] Acute        [ ]  Acute on Chronic        [ ] Chronic         [ ] Diastolic     [ ]  Combined    •  CAIO:     [ ] ATN        [ ]  Renal medullary necrosis       [ ]  Renal cortical necrosis                  [ ] Other pathological Lesion:  •  CKD:  [ ] Stage I   [ ] Stage II  [ ] Stage III    [ ]Stage IV   [ ]  CKD V   [ ]  Other/Unspecified:    •  Abdominal Nutritional Status:   [ ] Malnutrition-See Nutrition note    [ ] Cachexia   [ ]  Other        [ ] Supplement ordered:            [ ] Morbid Obesity: BMI >=40         ASSESSMENT/PLAN:      STATUS POST: L TKA pod 1    CONTINUE:          [ ] PT wbat    [ ] DVT PPX- ASA BID    [ ] Pain Mgt con't current regimen    [ ] Dispo plan- home with home PT    Monitor Na, f/u repeat, may need fluid restriction.  d/c IVF.  Monitor FS, con't ISS, will assess if need to add additional insulin coverage, A1C 9.1.  Dr Mckenzie for medicine.  IS use.  Bowel regimen.

## 2017-03-03 NOTE — DISCHARGE NOTE ADULT - ADDITIONAL INSTRUCTIONS
No strenuous activity, heavy lifting, driving, tub bathing, or returning to work until cleared by MD.  You may shower--dressing is waterproof.  Remove dressing after post op day 7, then leave incision open to air.  Follow up with Dr. Sumner in his office in 2 weeks from surgery.  Any staples/sutures will be removed in 2 weeks.   If you don't have a bowel movement by post op day 3, then take Milk of Magnesia (over the counter).  If no bowel movement by at least post op day 5, then use a Dulcolax suppository (over the counter) and/or a Fleets enema--if still no bowel movement, call your MD.  Contact your doctor if you experience: fever greater than 101.5, chills, chest pain, difficulty breathing, bleeding, redness or heat around the incision.    Please follow up with your primary care provider.

## 2017-03-03 NOTE — CONSULT NOTE ADULT - CONSULT REASON
CC: Hyperglycemia and hyponatremia.    HPI:  67yo M c/o left knee pain x 5 years s/p mechanical fall at work in 2012. Pt's pain has progressively worsened without improvement and has become increasingly unresponsive to past conservative management including physical therapy, cortisone injection (x1) and arthroscopic surgery. Pain is non-radiating. Pt ambulates with the use of a cane s/p right TKR in July 2016. Denies numbness/tingling/weakness of extremities. Denies h/o DVT. Denies fever, chills, CP, SOB, N/V.  POD-1 of left THR. Having hyperglycemia and Na: 130    PAST MEDICAL & SURGICAL HISTORY:  OA (osteoarthritis)  Obesity  HLD (hyperlipidemia)  Hypertension  Diabetes mellitus, type 2  S/P arthroscopy of left knee: 2014  S/P hemorrhoidectomy: 1980  H/O total knee replacement, right: 7/2016  S/P laparoscopic cholecystectomy: 5/13/2015  H/O arthroscopy of right knee: 2014  S/P arthroscopic surgery of left knee: 2014  S/P hemorrhoidectomy: 1980    No significant FH and social history is non-contributory other than occasional etoh.  NKDA.

## 2017-03-03 NOTE — PROGRESS NOTE ADULT - SUBJECTIVE AND OBJECTIVE BOX
No events    T(F): 99.5, Max: 100.3 (03-02 @ 21:04)  HR: 91 (55 - 93)  BP: 145/79 (98/61 - 160/78)  RR: 16 (8 - 22)  SpO2: 96% (94% - 100%)  Wt(kg): --    I & Os for current day (as of 03-03 @ 06:38)  =============================================  IN:    lactated ringers.: 1040 ml    Oral Fluid: 720 ml    Solution: 100 ml    Total IN: 1860 ml  ---------------------------------------------  OUT:    Voided: 1810 ml    Total OUT: 1810 ml  ---------------------------------------------  Total NET: 50 ml    PE: LLE  Sensation to light touch intact S/S/DP/SP/Tib, Strength EHL/FHL/TA/GS 5/5, DP 2+, Ext WWP

## 2017-03-03 NOTE — DISCHARGE NOTE ADULT - INSTRUCTIONS
Please do not take HCTZ-Losartan medicine till Friday 3/10/17. You can resume the medicine in its original home dose on Friday 3/10/17.

## 2017-03-03 NOTE — CONSULT NOTE ADULT - PROBLEM SELECTOR RECOMMENDATION 2
Hypervolemic hyponatremia. Likely related to intra/post op fluids.  * Please repeat BMP at 2pm.  * Depending on results, may fluid restrict him if Na has decreased.

## 2017-03-03 NOTE — DISCHARGE NOTE ADULT - REASON FOR ADMISSION
left knee pain left knee pain    N.B: Please do not take HCTZ-Losartan medicine till Friday 3/10/17. You can resume the medicine in its original home dose on Friday 3/10/17.

## 2017-03-03 NOTE — DISCHARGE NOTE ADULT - HOSPITAL COURSE
Admitted  Surgery - L TKA 3/2/17  Medicine consult  Perioperative abx  Pain control  DVT ppx  PT consult Admitted  Surgery - L TKA 3/2/17  Medicine consult  Perioperative abx  Pain control  DVT ppx  PT consult    Please do not take HCTZ-Losartan medicine till Friday 3/10/17. You can resume the medicine in its original home dose on Friday 3/10/17.

## 2017-03-04 LAB
ANION GAP SERPL CALC-SCNC: 7 MMOL/L — LOW (ref 9–16)
BUN SERPL-MCNC: 21 MG/DL — SIGNIFICANT CHANGE UP (ref 7–23)
CALCIUM SERPL-MCNC: 9.9 MG/DL — SIGNIFICANT CHANGE UP (ref 8.5–10.5)
CHLORIDE SERPL-SCNC: 95 MMOL/L — LOW (ref 96–108)
CO2 SERPL-SCNC: 29 MMOL/L — SIGNIFICANT CHANGE UP (ref 22–31)
CREAT SERPL-MCNC: 0.94 MG/DL — SIGNIFICANT CHANGE UP (ref 0.5–1.3)
GLUCOSE SERPL-MCNC: 259 MG/DL — HIGH (ref 70–99)
HCT VFR BLD CALC: 39 % — SIGNIFICANT CHANGE UP (ref 39–50)
HGB BLD-MCNC: 13.5 G/DL — SIGNIFICANT CHANGE UP (ref 13–17)
MCHC RBC-ENTMCNC: 28.2 PG — SIGNIFICANT CHANGE UP (ref 27–34)
MCHC RBC-ENTMCNC: 34.6 G/DL — SIGNIFICANT CHANGE UP (ref 32–36)
MCV RBC AUTO: 81.6 FL — SIGNIFICANT CHANGE UP (ref 80–100)
PLATELET # BLD AUTO: 169 K/UL — SIGNIFICANT CHANGE UP (ref 150–400)
POTASSIUM SERPL-MCNC: 3.8 MMOL/L — SIGNIFICANT CHANGE UP (ref 3.5–5.3)
POTASSIUM SERPL-SCNC: 3.8 MMOL/L — SIGNIFICANT CHANGE UP (ref 3.5–5.3)
RBC # BLD: 4.78 M/UL — SIGNIFICANT CHANGE UP (ref 4.2–5.8)
RBC # FLD: 13 % — SIGNIFICANT CHANGE UP (ref 10.3–16.9)
SODIUM SERPL-SCNC: 131 MMOL/L — LOW (ref 135–145)
WBC # BLD: 11.4 K/UL — HIGH (ref 3.8–10.5)
WBC # FLD AUTO: 11.4 K/UL — HIGH (ref 3.8–10.5)

## 2017-03-04 RX ORDER — CELECOXIB 200 MG/1
1 CAPSULE ORAL
Qty: 0 | Refills: 0 | COMMUNITY
Start: 2017-03-04

## 2017-03-04 RX ORDER — OXYCODONE HYDROCHLORIDE 5 MG/1
1 TABLET ORAL
Qty: 0 | Refills: 0 | COMMUNITY
Start: 2017-03-04

## 2017-03-04 RX ORDER — LOSARTAN POTASSIUM 100 MG/1
1 TABLET, FILM COATED ORAL
Qty: 0 | Refills: 0 | COMMUNITY
Start: 2017-03-04

## 2017-03-04 RX ORDER — INSULIN LISPRO 100/ML
VIAL (ML) SUBCUTANEOUS
Qty: 0 | Refills: 0 | Status: DISCONTINUED | OUTPATIENT
Start: 2017-03-04 | End: 2017-03-05

## 2017-03-04 RX ORDER — ASPIRIN/CALCIUM CARB/MAGNESIUM 324 MG
1 TABLET ORAL
Qty: 0 | Refills: 0 | COMMUNITY
Start: 2017-03-04

## 2017-03-04 RX ORDER — INSULIN GLARGINE 100 [IU]/ML
10 INJECTION, SOLUTION SUBCUTANEOUS AT BEDTIME
Qty: 0 | Refills: 0 | Status: DISCONTINUED | OUTPATIENT
Start: 2017-03-04 | End: 2017-03-05

## 2017-03-04 RX ADMIN — Medication 5 MILLIGRAM(S): at 07:39

## 2017-03-04 RX ADMIN — LOSARTAN POTASSIUM 100 MILLIGRAM(S): 100 TABLET, FILM COATED ORAL at 07:41

## 2017-03-04 RX ADMIN — Medication 10: at 21:34

## 2017-03-04 RX ADMIN — Medication 500 MILLIGRAM(S): at 17:22

## 2017-03-04 RX ADMIN — Medication 325 MILLIGRAM(S): at 17:22

## 2017-03-04 RX ADMIN — CELECOXIB 200 MILLIGRAM(S): 200 CAPSULE ORAL at 07:41

## 2017-03-04 RX ADMIN — Medication 100 MILLIGRAM(S): at 21:35

## 2017-03-04 RX ADMIN — Medication 325 MILLIGRAM(S): at 12:05

## 2017-03-04 RX ADMIN — CELECOXIB 200 MILLIGRAM(S): 200 CAPSULE ORAL at 17:41

## 2017-03-04 RX ADMIN — Medication 325 MILLIGRAM(S): at 07:42

## 2017-03-04 RX ADMIN — Medication 12: at 12:05

## 2017-03-04 RX ADMIN — INSULIN GLARGINE 10 UNIT(S): 100 INJECTION, SOLUTION SUBCUTANEOUS at 21:35

## 2017-03-04 RX ADMIN — Medication 4: at 07:43

## 2017-03-04 RX ADMIN — OXYCODONE HYDROCHLORIDE 10 MILLIGRAM(S): 5 TABLET ORAL at 13:32

## 2017-03-04 RX ADMIN — Medication 325 MILLIGRAM(S): at 07:39

## 2017-03-04 RX ADMIN — CELECOXIB 200 MILLIGRAM(S): 200 CAPSULE ORAL at 17:22

## 2017-03-04 RX ADMIN — MORPHINE SULFATE 15 MILLIGRAM(S): 50 CAPSULE, EXTENDED RELEASE ORAL at 07:41

## 2017-03-04 RX ADMIN — Medication 10: at 17:22

## 2017-03-04 RX ADMIN — OXYCODONE HYDROCHLORIDE 10 MILLIGRAM(S): 5 TABLET ORAL at 14:10

## 2017-03-04 RX ADMIN — MORPHINE SULFATE 15 MILLIGRAM(S): 50 CAPSULE, EXTENDED RELEASE ORAL at 17:21

## 2017-03-04 RX ADMIN — Medication 500 MILLIGRAM(S): at 07:38

## 2017-03-04 RX ADMIN — SENNA PLUS 2 TABLET(S): 8.6 TABLET ORAL at 21:35

## 2017-03-04 RX ADMIN — Medication 1 MILLIGRAM(S): at 12:05

## 2017-03-04 RX ADMIN — POLYETHYLENE GLYCOL 3350 17 GRAM(S): 17 POWDER, FOR SOLUTION ORAL at 12:05

## 2017-03-04 RX ADMIN — OXYCODONE HYDROCHLORIDE 10 MILLIGRAM(S): 5 TABLET ORAL at 09:46

## 2017-03-04 RX ADMIN — SIMVASTATIN 5 MILLIGRAM(S): 20 TABLET, FILM COATED ORAL at 21:35

## 2017-03-04 RX ADMIN — Medication 1 TABLET(S): at 13:31

## 2017-03-04 RX ADMIN — MORPHINE SULFATE 15 MILLIGRAM(S): 50 CAPSULE, EXTENDED RELEASE ORAL at 17:41

## 2017-03-04 RX ADMIN — Medication 1 TABLET(S): at 21:35

## 2017-03-04 RX ADMIN — OXYCODONE HYDROCHLORIDE 10 MILLIGRAM(S): 5 TABLET ORAL at 10:25

## 2017-03-04 RX ADMIN — Medication 1 TABLET(S): at 12:05

## 2017-03-04 RX ADMIN — Medication 100 MILLIGRAM(S): at 13:31

## 2017-03-04 RX ADMIN — Medication 1 TABLET(S): at 07:40

## 2017-03-04 RX ADMIN — Medication 100 MILLIGRAM(S): at 07:38

## 2017-03-04 RX ADMIN — Medication 5 MILLIGRAM(S): at 17:22

## 2017-03-04 NOTE — PROGRESS NOTE ADULT - SUBJECTIVE AND OBJECTIVE BOX
SUBJECTIVE: Patient seen and examined.  No issues overnight. Pain well controlled. Denies CP/SOB    OBJECTIVE:     Vital Signs Last 24 Hrs  T(C): 37.6, Max: 38.2 (03-03 @ 14:27)  T(F): 99.7, Max: 100.8 (03-03 @ 14:27)  HR: 89 (89 - 112)  BP: 112/58 (112/58 - 151/61)  BP(mean): --  RR: 17 (16 - 18)  SpO2: 97% (94% - 97%)                Dressing: clean/dry/intact            Motor exam:  5/5 TA/GS/EHL/FHL         Sensation:   Sensation intact all dermatomes         Vascular:  Warm/pink/well perfused             LABS:                        13.5   11.4  )-----------( 169      ( 04 Mar 2017 07:14 )             39.0     04 Mar 2017 07:17    131    |  95     |  21     ----------------------------<  259    3.8     |  29     |  0.94     Ca    9.9        04 Mar 2017 07:17        MEDICATIONS:    Anticoagulation:  aspirin enteric coated 325milliGRAM(s) Oral two times a day      Antibiotics:         A/P :   s/p   L TKA  -    DVT ppx: aspirin enteric coated 325milliGRAM(s) Oral two times a day  - fkluid restriction  -    Weight bearing status:  WBAT  -    Physical Therapy  -    Dispo: home  - fu NA tomorrow - NA today is 131

## 2017-03-04 NOTE — PROGRESS NOTE ADULT - SUBJECTIVE AND OBJECTIVE BOX
Pt says pain is manageable.  Has not had BM since post-op.  Good appetite.  No other complaints.      INTERVAL HPI/OVERNIGHT EVENTS:    Review of Systems: 12 point review of systems otherwise negative    MEDICATIONS  (STANDING):  aspirin enteric coated 325milliGRAM(s) Oral two times a day  celecoxib 200milliGRAM(s) Oral two times a day before meals  BUpivacaine liposome 1.3% Injectable (no eMAR) 20milliLiter(s) Local Injection once  calcium carbonate 1250 mG + Vitamin D (OsCal 500 + D) 1Tablet(s) Oral three times a day  polyethylene glycol 3350 17Gram(s) Oral daily  docusate sodium 100milliGRAM(s) Oral three times a day  ferrous    sulfate 325milliGRAM(s) Oral three times a day with meals  folic acid 1milliGRAM(s) Oral daily  multivitamin 1Tablet(s) Oral daily  ascorbic acid 500milliGRAM(s) Oral two times a day  dextrose 50% Injectable 25Gram(s) IV Push once  simvastatin 5milliGRAM(s) Oral at bedtime  hydrochlorothiazide   Tablet 25milliGRAM(s) Oral daily  losartan 100milliGRAM(s) Oral daily  senna 2Tablet(s) Oral at bedtime  bisacodyl 5milliGRAM(s) Oral every 12 hours  morphine ER Tablet 15milliGRAM(s) Oral every 12 hours  insulin glargine Injectable (LANTUS) 10Unit(s) SubCutaneous at bedtime  insulin lispro (HumaLOG) corrective regimen sliding scale  SubCutaneous Before meals and at bedtime    MEDICATIONS  (PRN):  acetaminophen   Tablet 650milliGRAM(s) Oral every 6 hours PRN For Temp over 38.3 C (100.94 F)  oxyCODONE IR 5milliGRAM(s) Oral every 4 hours PRN Moderate Pain (4 - 6)  morphine  - Injectable 4milliGRAM(s) IV Push every 4 hours PRN For breakthrough pain unrelieved by PO meds  aluminum hydroxide/magnesium hydroxide/simethicone Suspension 30milliLiter(s) Oral four times a day PRN Indigestion  ondansetron Injectable 4milliGRAM(s) IV Push every 6 hours PRN Nausea and/or Vomiting  magnesium hydroxide Suspension 30milliLiter(s) Oral daily PRN Constipation unrelieved by other meds  bisacodyl Suppository 10milliGRAM(s) Rectal daily PRN If no bowel movement by postoperative day #2  oxyCODONE IR 10milliGRAM(s) Oral every 4 hours PRN Severe Pain (7 - 10)  ketorolac   Injectable 30milliGRAM(s) IV Push every 6 hours PRN Severe Pain (7 - 10)        Allergies    No Known Allergies    Intolerances          Vital Signs Last 24 Hrs  T(C): 37.6, Max: 38.2 (03-03 @ 14:27)  T(F): 99.7, Max: 100.8 (03-03 @ 14:27)  HR: 89 (89 - 112)  BP: 112/58 (112/58 - 151/61)  BP(mean): --  RR: 17 (16 - 18)  SpO2: 97% (94% - 97%)  CAPILLARY BLOOD GLUCOSE  349 (04 Mar 2017 07:31)  288 (03 Mar 2017 22:18)  327 (03 Mar 2017 21:30)  351 (03 Mar 2017 16:46)      I & Os for current day (as of 03-04 @ 11:35)  =============================================  IN: 800 ml / OUT: 1650 ml / NET: -850 ml      Physical Exam:    Daily     Daily   General:  Well appearing, NAD, not cachetic  HEENT:  Nonicteric, PERRLA  CV:  RRR, no murmur, no JVD  Lungs:  CTA B/L, no wheezes, rales, rhonchi  Abdomen:  Soft, non-tender, no distended, obese  Extremities:  2+ pulses, no c/c, no edema, L knee in cooling wrap  :  No villalobos  Neuro:  AAOx3, non-focal, CN II-XII grossly intact      LABS:                        13.5   11.4  )-----------( 169      ( 04 Mar 2017 07:14 )             39.0     04 Mar 2017 07:17    131    |  95     |  21     ----------------------------<  259    3.8     |  29     |  0.94     Ca    9.9        04 Mar 2017 07:17                RADIOLOGY & ADDITIONAL TESTS:    ---------------------------------------------------------------------------  I personally reviewed: [  ]EKG   [  ]CXR    [  ] CT    [  ]Other  ---------------------------------------------------------------------------

## 2017-03-04 NOTE — PROGRESS NOTE ADULT - SUBJECTIVE AND OBJECTIVE BOX
Patient in bed ambulating with use of walker. Has been instructed to use IS. Comfortable    Left LE dressing clean   ROM -5 to 60 flexion  Calf soft NT  NVI  SILT

## 2017-03-05 RX ORDER — INSULIN LISPRO 100/ML
VIAL (ML) SUBCUTANEOUS
Qty: 0 | Refills: 0 | Status: DISCONTINUED | OUTPATIENT
Start: 2017-03-05 | End: 2017-03-06

## 2017-03-05 RX ORDER — INSULIN LISPRO 100/ML
7 VIAL (ML) SUBCUTANEOUS
Qty: 0 | Refills: 0 | Status: DISCONTINUED | OUTPATIENT
Start: 2017-03-05 | End: 2017-03-06

## 2017-03-05 RX ORDER — HUMAN INSULIN 100 [IU]/ML
15 INJECTION, SUSPENSION SUBCUTANEOUS ONCE
Qty: 0 | Refills: 0 | Status: COMPLETED | OUTPATIENT
Start: 2017-03-05 | End: 2017-03-05

## 2017-03-05 RX ORDER — INSULIN GLARGINE 100 [IU]/ML
25 INJECTION, SOLUTION SUBCUTANEOUS AT BEDTIME
Qty: 0 | Refills: 0 | Status: DISCONTINUED | OUTPATIENT
Start: 2017-03-05 | End: 2017-03-06

## 2017-03-05 RX ADMIN — CELECOXIB 200 MILLIGRAM(S): 200 CAPSULE ORAL at 07:59

## 2017-03-05 RX ADMIN — INSULIN GLARGINE 25 UNIT(S): 100 INJECTION, SOLUTION SUBCUTANEOUS at 21:22

## 2017-03-05 RX ADMIN — Medication 325 MILLIGRAM(S): at 18:34

## 2017-03-05 RX ADMIN — Medication 1 TABLET(S): at 21:22

## 2017-03-05 RX ADMIN — Medication 1 MILLIGRAM(S): at 12:32

## 2017-03-05 RX ADMIN — MAGNESIUM HYDROXIDE 30 MILLILITER(S): 400 TABLET, CHEWABLE ORAL at 12:32

## 2017-03-05 RX ADMIN — CELECOXIB 200 MILLIGRAM(S): 200 CAPSULE ORAL at 18:33

## 2017-03-05 RX ADMIN — Medication 7 UNIT(S): at 18:35

## 2017-03-05 RX ADMIN — Medication 100 MILLIGRAM(S): at 06:53

## 2017-03-05 RX ADMIN — Medication 1 TABLET(S): at 12:32

## 2017-03-05 RX ADMIN — MORPHINE SULFATE 15 MILLIGRAM(S): 50 CAPSULE, EXTENDED RELEASE ORAL at 18:40

## 2017-03-05 RX ADMIN — Medication 325 MILLIGRAM(S): at 12:32

## 2017-03-05 RX ADMIN — SENNA PLUS 2 TABLET(S): 8.6 TABLET ORAL at 21:23

## 2017-03-05 RX ADMIN — MORPHINE SULFATE 15 MILLIGRAM(S): 50 CAPSULE, EXTENDED RELEASE ORAL at 18:55

## 2017-03-05 RX ADMIN — Medication 5 MILLIGRAM(S): at 18:40

## 2017-03-05 RX ADMIN — HUMAN INSULIN 15 UNIT(S): 100 INJECTION, SUSPENSION SUBCUTANEOUS at 13:29

## 2017-03-05 RX ADMIN — Medication 8: at 21:22

## 2017-03-05 RX ADMIN — Medication 100 MILLIGRAM(S): at 21:23

## 2017-03-05 RX ADMIN — Medication 500 MILLIGRAM(S): at 06:53

## 2017-03-05 RX ADMIN — CELECOXIB 200 MILLIGRAM(S): 200 CAPSULE ORAL at 18:41

## 2017-03-05 RX ADMIN — Medication 8: at 12:33

## 2017-03-05 RX ADMIN — Medication 325 MILLIGRAM(S): at 06:53

## 2017-03-05 RX ADMIN — Medication 100 MILLIGRAM(S): at 12:32

## 2017-03-05 RX ADMIN — Medication 7 UNIT(S): at 12:33

## 2017-03-05 RX ADMIN — Medication 6: at 18:34

## 2017-03-05 RX ADMIN — Medication 10: at 07:59

## 2017-03-05 RX ADMIN — Medication 1 TABLET(S): at 06:53

## 2017-03-05 RX ADMIN — POLYETHYLENE GLYCOL 3350 17 GRAM(S): 17 POWDER, FOR SOLUTION ORAL at 12:32

## 2017-03-05 RX ADMIN — Medication 500 MILLIGRAM(S): at 18:34

## 2017-03-05 RX ADMIN — SIMVASTATIN 5 MILLIGRAM(S): 20 TABLET, FILM COATED ORAL at 21:23

## 2017-03-05 RX ADMIN — Medication 325 MILLIGRAM(S): at 18:40

## 2017-03-05 RX ADMIN — Medication 5 MILLIGRAM(S): at 06:53

## 2017-03-05 RX ADMIN — MORPHINE SULFATE 15 MILLIGRAM(S): 50 CAPSULE, EXTENDED RELEASE ORAL at 06:54

## 2017-03-05 RX ADMIN — Medication 325 MILLIGRAM(S): at 06:57

## 2017-03-05 NOTE — PROGRESS NOTE ADULT - SUBJECTIVE AND OBJECTIVE BOX
Pt seen and examined at bedside. Very sad b/c of death of his uncle.  He says pain controlled, no BM but passing gas.  Good appetite.      INTERVAL HPI/OVERNIGHT EVENTS:    Review of Systems: 12 point review of systems otherwise negative    MEDICATIONS  (STANDING):  aspirin enteric coated 325milliGRAM(s) Oral two times a day  celecoxib 200milliGRAM(s) Oral two times a day before meals  BUpivacaine liposome 1.3% Injectable (no eMAR) 20milliLiter(s) Local Injection once  calcium carbonate 1250 mG + Vitamin D (OsCal 500 + D) 1Tablet(s) Oral three times a day  polyethylene glycol 3350 17Gram(s) Oral daily  docusate sodium 100milliGRAM(s) Oral three times a day  ferrous    sulfate 325milliGRAM(s) Oral three times a day with meals  folic acid 1milliGRAM(s) Oral daily  multivitamin 1Tablet(s) Oral daily  ascorbic acid 500milliGRAM(s) Oral two times a day  dextrose 50% Injectable 25Gram(s) IV Push once  simvastatin 5milliGRAM(s) Oral at bedtime  hydrochlorothiazide   Tablet 25milliGRAM(s) Oral daily  losartan 100milliGRAM(s) Oral daily  senna 2Tablet(s) Oral at bedtime  bisacodyl 5milliGRAM(s) Oral every 12 hours  morphine ER Tablet 15milliGRAM(s) Oral every 12 hours  insulin glargine Injectable (LANTUS) 25Unit(s) SubCutaneous at bedtime  insulin NPH human recombinant 15Unit(s) SubCutaneous once  insulin lispro Injectable (HumaLOG) 7Unit(s) SubCutaneous three times a day before meals  insulin lispro (HumaLOG) corrective regimen sliding scale  SubCutaneous Before meals and at bedtime    MEDICATIONS  (PRN):  acetaminophen   Tablet 650milliGRAM(s) Oral every 6 hours PRN For Temp over 38.3 C (100.94 F)  oxyCODONE IR 5milliGRAM(s) Oral every 4 hours PRN Moderate Pain (4 - 6)  morphine  - Injectable 4milliGRAM(s) IV Push every 4 hours PRN For breakthrough pain unrelieved by PO meds  aluminum hydroxide/magnesium hydroxide/simethicone Suspension 30milliLiter(s) Oral four times a day PRN Indigestion  ondansetron Injectable 4milliGRAM(s) IV Push every 6 hours PRN Nausea and/or Vomiting  magnesium hydroxide Suspension 30milliLiter(s) Oral daily PRN Constipation unrelieved by other meds  bisacodyl Suppository 10milliGRAM(s) Rectal daily PRN If no bowel movement by postoperative day #2  oxyCODONE IR 10milliGRAM(s) Oral every 4 hours PRN Severe Pain (7 - 10)  ketorolac   Injectable 30milliGRAM(s) IV Push every 6 hours PRN Severe Pain (7 - 10)      Allergies    No Known Allergies    Intolerances        Vital Signs Last 24 Hrs  T(C): 36.6, Max: 37.1 (03-04 @ 14:25)  T(F): 97.8, Max: 98.7 (03-04 @ 14:25)  HR: 101 (84 - 101)  BP: 135/85 (89/57 - 135/85)  BP(mean): 77 (77 - 77)  RR: 16 (15 - 16)  SpO2: 95% (92% - 97%)  CAPILLARY BLOOD GLUCOSE  323 (05 Mar 2017 12:03)  359 (05 Mar 2017 06:51)  360 (04 Mar 2017 21:14)  404 (04 Mar 2017 16:33)      I & Os for current day (as of 03-05 @ 12:54)  =============================================  IN: 820 ml / OUT: 1400 ml / NET: -580 ml      LABS:                        13.5   11.4  )-----------( 169      ( 04 Mar 2017 07:14 )             39.0     04 Mar 2017 07:17    131    |  95     |  21     ----------------------------<  259    3.8     |  29     |  0.94     Ca    9.9        04 Mar 2017 07:17                RADIOLOGY & ADDITIONAL TESTS:    ---------------------------------------------------------------------------  I personally reviewed: [  ]EKG   [  ]CXR    [  ] CT    [  ]Other  ---------------------------------------------------------------------------

## 2017-03-05 NOTE — PROGRESS NOTE ADULT - SUBJECTIVE AND OBJECTIVE BOX
SUBJECTIVE: Patient seen and examined.   overnight.      OBJECTIVE:     Vital Signs Last 24 Hrs  T(C): 36.6, Max: 37.6 (03-04 @ 08:45)  T(F): 97.8, Max: 99.7 (03-04 @ 08:45)  HR: 86 (84 - 96)  BP: 89/57 (89/57 - 112/58)  BP(mean): 77 (77 - 77)  RR: 16 (15 - 17)  SpO2: 95% (92% - 97%)                Dressing: clean/dry/intact            Motor exam:  5/5 TA/GS/EHL/FHL         Sensation:   Sensation intact all dermatomes         Vascular:  Warm/pink/well perfused             LABS:                        13.5   11.4  )-----------( 169      ( 04 Mar 2017 07:14 )             39.0     04 Mar 2017 07:17    131    |  95     |  21     ----------------------------<  259    3.8     |  29     |  0.94     Ca    9.9        04 Mar 2017 07:17        MEDICATIONS:    Anticoagulation:  aspirin enteric coated 325milliGRAM(s) Oral two times a day      Antibiotics:         A/P :   s/p   L TKA   -    DVT ppx: aspirin enteric coated 325milliGRAM(s) Oral two times a day  -    Weight bearing status:  WBAT  -    Physical Therapy  - increase ISS and pts home meds  -    Dispo: home once sugar controlled

## 2017-03-06 VITALS
OXYGEN SATURATION: 96 % | TEMPERATURE: 98 F | HEART RATE: 77 BPM | RESPIRATION RATE: 16 BRPM | DIASTOLIC BLOOD PRESSURE: 79 MMHG | SYSTOLIC BLOOD PRESSURE: 136 MMHG

## 2017-03-06 DIAGNOSIS — N17.9 ACUTE KIDNEY FAILURE, UNSPECIFIED: ICD-10-CM

## 2017-03-06 LAB
ANION GAP SERPL CALC-SCNC: 6 MMOL/L — LOW (ref 9–16)
BUN SERPL-MCNC: 42 MG/DL — HIGH (ref 7–23)
CALCIUM SERPL-MCNC: 9.6 MG/DL — SIGNIFICANT CHANGE UP (ref 8.5–10.5)
CHLORIDE SERPL-SCNC: 100 MMOL/L — SIGNIFICANT CHANGE UP (ref 96–108)
CO2 SERPL-SCNC: 31 MMOL/L — SIGNIFICANT CHANGE UP (ref 22–31)
CREAT SERPL-MCNC: 1.29 MG/DL — SIGNIFICANT CHANGE UP (ref 0.5–1.3)
GLUCOSE SERPL-MCNC: 249 MG/DL — HIGH (ref 70–99)
HCT VFR BLD CALC: 36.8 % — LOW (ref 39–50)
HGB BLD-MCNC: 12.4 G/DL — LOW (ref 13–17)
MCHC RBC-ENTMCNC: 27.9 PG — SIGNIFICANT CHANGE UP (ref 27–34)
MCHC RBC-ENTMCNC: 33.7 G/DL — SIGNIFICANT CHANGE UP (ref 32–36)
MCV RBC AUTO: 82.7 FL — SIGNIFICANT CHANGE UP (ref 80–100)
OSMOLALITY UR: 782 MOSMOL/KG — HIGH (ref 100–650)
PLATELET # BLD AUTO: 224 K/UL — SIGNIFICANT CHANGE UP (ref 150–400)
POTASSIUM SERPL-MCNC: 4.3 MMOL/L — SIGNIFICANT CHANGE UP (ref 3.5–5.3)
POTASSIUM SERPL-SCNC: 4.3 MMOL/L — SIGNIFICANT CHANGE UP (ref 3.5–5.3)
RBC # BLD: 4.45 M/UL — SIGNIFICANT CHANGE UP (ref 4.2–5.8)
RBC # FLD: 12.7 % — SIGNIFICANT CHANGE UP (ref 10.3–16.9)
SODIUM SERPL-SCNC: 137 MMOL/L — SIGNIFICANT CHANGE UP (ref 135–145)
UUN UR-MCNC: 1438 MG/DL — SIGNIFICANT CHANGE UP
WBC # BLD: 8 K/UL — SIGNIFICANT CHANGE UP (ref 3.8–10.5)
WBC # FLD AUTO: 8 K/UL — SIGNIFICANT CHANGE UP (ref 3.8–10.5)

## 2017-03-06 RX ORDER — MELOXICAM 15 MG/1
1 TABLET ORAL
Qty: 30 | Refills: 0 | OUTPATIENT
Start: 2017-03-06 | End: 2017-04-05

## 2017-03-06 RX ORDER — OXYCODONE HYDROCHLORIDE 5 MG/1
1 TABLET ORAL
Qty: 60 | Refills: 0 | OUTPATIENT
Start: 2017-03-06

## 2017-03-06 RX ORDER — SODIUM CHLORIDE 9 MG/ML
1000 INJECTION INTRAMUSCULAR; INTRAVENOUS; SUBCUTANEOUS
Qty: 0 | Refills: 0 | Status: DISCONTINUED | OUTPATIENT
Start: 2017-03-06 | End: 2017-03-06

## 2017-03-06 RX ADMIN — Medication 4: at 08:19

## 2017-03-06 RX ADMIN — Medication 5 MILLIGRAM(S): at 05:44

## 2017-03-06 RX ADMIN — Medication 325 MILLIGRAM(S): at 17:17

## 2017-03-06 RX ADMIN — CELECOXIB 200 MILLIGRAM(S): 200 CAPSULE ORAL at 18:00

## 2017-03-06 RX ADMIN — Medication 7 UNIT(S): at 08:20

## 2017-03-06 RX ADMIN — Medication 5 MILLIGRAM(S): at 17:17

## 2017-03-06 RX ADMIN — POLYETHYLENE GLYCOL 3350 17 GRAM(S): 17 POWDER, FOR SOLUTION ORAL at 09:50

## 2017-03-06 RX ADMIN — Medication 500 MILLIGRAM(S): at 05:44

## 2017-03-06 RX ADMIN — Medication 1 MILLIGRAM(S): at 09:50

## 2017-03-06 RX ADMIN — Medication 1 TABLET(S): at 12:39

## 2017-03-06 RX ADMIN — Medication 7 UNIT(S): at 11:48

## 2017-03-06 RX ADMIN — Medication 10: at 11:48

## 2017-03-06 RX ADMIN — Medication 100 MILLIGRAM(S): at 05:44

## 2017-03-06 RX ADMIN — MORPHINE SULFATE 15 MILLIGRAM(S): 50 CAPSULE, EXTENDED RELEASE ORAL at 05:43

## 2017-03-06 RX ADMIN — Medication 325 MILLIGRAM(S): at 09:50

## 2017-03-06 RX ADMIN — Medication 100 MILLIGRAM(S): at 12:40

## 2017-03-06 RX ADMIN — CELECOXIB 200 MILLIGRAM(S): 200 CAPSULE ORAL at 17:17

## 2017-03-06 RX ADMIN — MORPHINE SULFATE 15 MILLIGRAM(S): 50 CAPSULE, EXTENDED RELEASE ORAL at 17:18

## 2017-03-06 RX ADMIN — Medication 1 TABLET(S): at 05:43

## 2017-03-06 RX ADMIN — Medication 1 TABLET(S): at 09:50

## 2017-03-06 RX ADMIN — Medication 500 MILLIGRAM(S): at 17:17

## 2017-03-06 RX ADMIN — Medication 325 MILLIGRAM(S): at 19:59

## 2017-03-06 RX ADMIN — CELECOXIB 200 MILLIGRAM(S): 200 CAPSULE ORAL at 08:17

## 2017-03-06 RX ADMIN — Medication 325 MILLIGRAM(S): at 05:44

## 2017-03-06 RX ADMIN — Medication 325 MILLIGRAM(S): at 05:43

## 2017-03-06 RX ADMIN — Medication 325 MILLIGRAM(S): at 17:18

## 2017-03-06 NOTE — PROGRESS NOTE ADULT - PROBLEM SELECTOR PLAN 5
Has not had a BM, on opiates, METZGER-2, and feels pain controlled
Has not had a BM, on opiates, METZGER-2, and feels pain controlled  RN to give additional laxatives
pain well controlled  safe for d/c home medically

## 2017-03-06 NOTE — PROGRESS NOTE ADULT - RESPIRATORY
Breath Sounds equal & clear to percussion & auscultation, no accessory muscle use
Breath Sounds equal & clear to percussion & auscultation, no accessory muscle use

## 2017-03-06 NOTE — PROGRESS NOTE ADULT - PROBLEM SELECTOR PROBLEM 1
Non-insulin dependent type 2 diabetes mellitus

## 2017-03-06 NOTE — PROGRESS NOTE ADULT - PROBLEM SELECTOR PLAN 6
Prerenal azotemia 2/2 HCTZ and poor intake  Recommend d/c HCTZ for 1 week and copious PO hydration as an outpt

## 2017-03-06 NOTE — PROGRESS NOTE ADULT - ASSESSMENT
65yo M, PMH f HTN and NIDDM-2 and severe OA. Admitted for elective left TKA, now POD-1. Consulted for hyperglycemia and hyponatremia.
66y m s/p L TKR  POD#1  -Pain Control  -PT WBAT  -DVT PPX ASA  -Dispo Planning
66y m s/p L TKR  POD#4  -Pain Control  -PT WBAT  -DVT PPX ASA  -Dispo Planning Home  -F/U Med recs, DM2
POD#2 s/p Let TKA  D/C plan to home  DVT prophylaxis  pain control
67yo M, PMH f HTN and NIDDM-2 and severe OA. Admitted for elective left TKA, now POD-1. Consulted for hyperglycemia and hyponatremia.
67yo M, PMH f HTN and NIDDM-2 and severe OA. Admitted for elective left TKA, now POD-1. Consulted for hyperglycemia and hyponatremia.

## 2017-03-06 NOTE — PROGRESS NOTE ADULT - SUBJECTIVE AND OBJECTIVE BOX
No events o/n    T(F): 97.8, Max: 98.8 (03-05 @ 21:01)  HR: 73 (73 - 101)  BP: 95/58 (95/58 - 135/85)  RR: 16 (15 - 16)  SpO2: 97% (93% - 97%)  Wt(kg): --    I & Os for current day (as of 03-06 @ 08:44)  =============================================  IN:    Total IN: 0 ml  ---------------------------------------------  OUT:    Voided: 750 ml    Total OUT: 750 ml  ---------------------------------------------  Total NET: -750 ml    PE: LLE: dsg cdi  Sensation to light touch intact S/S/DP/SP/Tib, Strength EHL/FHL/TA/GS 5/5, DP 2+, Ext WWP

## 2017-03-06 NOTE — PROGRESS NOTE ADULT - PROBLEM SELECTOR PLAN 1
Increased lantus to 25 u QHS and changed to 7 u premeal with RAFFAELE  HbA1c is 9, so will need oral medication mgmt on d/c, d/w pt
Increased lantus to 25 u QHS and changed to 7 u premeal with RAFFAELE to ISF 25 (given additional NPH 15 u this AM)  HbA1c is 9, so will need oral medication mgmt on d/c, d/w pt
Increased lantus to 10 u QHS and changed RAFFAELE to ISF 25  HbA1c is 9, so will need oral medication mgmt on d/c

## 2017-03-06 NOTE — PROGRESS NOTE ADULT - SUBJECTIVE AND OBJECTIVE BOX
POST OPERATIVE DAY #: 4  STATUS POST: Left TKA                       SUBJECTIVE: Patient seen and examined.     Pain:  well controlled     OBJECTIVE:     Vital Signs Last 24 Hrs  T(C): 36.5, Max: 37.1 (03-05 @ 21:01)  T(F): 97.7, Max: 98.8 (03-05 @ 21:01)  HR: 71 (71 - 91)  BP: 115/84 (95/58 - 122/70)  BP(mean): --  RR: 15 (15 - 16)  SpO2: 92% (92% - 97%)    Affected extremity:          Dressing: clean/dry/intact          Sensation: intact to light touch          Motor exam:  5 / 5 Tibialis Anterior/Gastrocnemius-Soleus          warm, well-perfused; capillary refill < 3 seconds              I&O's Detail  I & Os for 24h ending 06 Mar 2017 07:00  =============================================  IN:    Total IN: 0 ml  ---------------------------------------------  OUT:    Voided: 750 ml    Total OUT: 750 ml  ---------------------------------------------  Total NET: -750 ml    I & Os for current day (as of 06 Mar 2017 14:28)  =============================================  IN:    Oral Fluid: 240 ml    sodium chloride 0.9%.: 160 ml    Total IN: 400 ml  ---------------------------------------------  OUT:    Total OUT: 0 ml  ---------------------------------------------  Total NET: 400 ml      LABS:                        12.4   8.0   )-----------( 224      ( 06 Mar 2017 07:05 )             36.8     06 Mar 2017 07:05    137    |  100    |  42     ----------------------------<  249    4.3     |  31     |  1.29     Ca    9.6        06 Mar 2017 07:05            MEDICATIONS:    acetaminophen   Tablet 650milliGRAM(s) Oral every 6 hours PRN  celecoxib 200milliGRAM(s) Oral two times a day before meals  oxyCODONE IR 5milliGRAM(s) Oral every 4 hours PRN  morphine  - Injectable 4milliGRAM(s) IV Push every 4 hours PRN  ondansetron Injectable 4milliGRAM(s) IV Push every 6 hours PRN  morphine ER Tablet 15milliGRAM(s) Oral every 12 hours  oxyCODONE IR 10milliGRAM(s) Oral every 4 hours PRN  ketorolac   Injectable 30milliGRAM(s) IV Push every 6 hours PRN    aspirin enteric coated 325milliGRAM(s) Oral two times a day      RADIOLOGY & ADDITIONAL STUDIES:    ASSESSMENT AND PLAN:     1. Analgesic pain control  2. DVT prophylaxis: ASA  3. Antibiotics: completed post op abx  4. Weight Bearing Status:  Weight bearing as tolerated   5. Disposition: Home today, cleared by PT.  6. elevated BUN/Cr: 42/1.29, fluid restriction diet held, NS @ 80 started.  7. AM BS: 249, discussed with Dr. Marques, patient must follow up with his Endocrinologist within on week to discuss starting insulin. Patient agrees and understands.

## 2017-03-06 NOTE — PROGRESS NOTE ADULT - SUBJECTIVE AND OBJECTIVE BOX
Pt seen and examined at bedside.  He has no complaints, pain improved.    INTERVAL HPI/OVERNIGHT EVENTS:    Review of Systems: 12 point review of systems otherwise negative    MEDICATIONS  (STANDING):  aspirin enteric coated 325milliGRAM(s) Oral two times a day  celecoxib 200milliGRAM(s) Oral two times a day before meals  BUpivacaine liposome 1.3% Injectable (no eMAR) 20milliLiter(s) Local Injection once  calcium carbonate 1250 mG + Vitamin D (OsCal 500 + D) 1Tablet(s) Oral three times a day  polyethylene glycol 3350 17Gram(s) Oral daily  docusate sodium 100milliGRAM(s) Oral three times a day  ferrous    sulfate 325milliGRAM(s) Oral three times a day with meals  folic acid 1milliGRAM(s) Oral daily  multivitamin 1Tablet(s) Oral daily  ascorbic acid 500milliGRAM(s) Oral two times a day  dextrose 50% Injectable 25Gram(s) IV Push once  simvastatin 5milliGRAM(s) Oral at bedtime  hydrochlorothiazide   Tablet 25milliGRAM(s) Oral daily  losartan 100milliGRAM(s) Oral daily  senna 2Tablet(s) Oral at bedtime  bisacodyl 5milliGRAM(s) Oral every 12 hours  morphine ER Tablet 15milliGRAM(s) Oral every 12 hours  insulin glargine Injectable (LANTUS) 25Unit(s) SubCutaneous at bedtime  insulin lispro Injectable (HumaLOG) 7Unit(s) SubCutaneous three times a day before meals  insulin lispro (HumaLOG) corrective regimen sliding scale  SubCutaneous Before meals and at bedtime  sodium chloride 0.9%. 1000milliLiter(s) IV Continuous <Continuous>    MEDICATIONS  (PRN):  acetaminophen   Tablet 650milliGRAM(s) Oral every 6 hours PRN For Temp over 38.3 C (100.94 F)  oxyCODONE IR 5milliGRAM(s) Oral every 4 hours PRN Moderate Pain (4 - 6)  morphine  - Injectable 4milliGRAM(s) IV Push every 4 hours PRN For breakthrough pain unrelieved by PO meds  aluminum hydroxide/magnesium hydroxide/simethicone Suspension 30milliLiter(s) Oral four times a day PRN Indigestion  ondansetron Injectable 4milliGRAM(s) IV Push every 6 hours PRN Nausea and/or Vomiting  magnesium hydroxide Suspension 30milliLiter(s) Oral daily PRN Constipation unrelieved by other meds  bisacodyl Suppository 10milliGRAM(s) Rectal daily PRN If no bowel movement by postoperative day #2  oxyCODONE IR 10milliGRAM(s) Oral every 4 hours PRN Severe Pain (7 - 10)  ketorolac   Injectable 30milliGRAM(s) IV Push every 6 hours PRN Severe Pain (7 - 10)      Allergies    No Known Allergies    Intolerances        Vital Signs Last 24 Hrs  T(C): 36.6, Max: 37.1 (03-05 @ 21:01)  T(F): 97.9, Max: 98.8 (03-05 @ 21:01)  HR: 77 (71 - 89)  BP: 136/79 (95/58 - 136/79)  BP(mean): --  RR: 16 (15 - 16)  SpO2: 96% (92% - 97%)  CAPILLARY BLOOD GLUCOSE  379 (06 Mar 2017 11:22)  241 (06 Mar 2017 06:45)  309 (05 Mar 2017 21:21)    I & Os for 24h ending 03-06 @ 07:00  =============================================  IN: 0 ml / OUT: 750 ml / NET: -750 ml    I & Os for current day (as of 03-06 @ 17:42)  =============================================  IN: 960 ml / OUT: 400 ml / NET: 560 ml      LABS:                        12.4   8.0   )-----------( 224      ( 06 Mar 2017 07:05 )             36.8     06 Mar 2017 07:05    137    |  100    |  42     ----------------------------<  249    4.3     |  31     |  1.29     Ca    9.6        06 Mar 2017 07:05                RADIOLOGY & ADDITIONAL TESTS:    ---------------------------------------------------------------------------  I personally reviewed: [  ]EKG   [  ]CXR    [  ] CT    [  ]Other  ---------------------------------------------------------------------------

## 2017-03-06 NOTE — PROGRESS NOTE ADULT - SUBJECTIVE AND OBJECTIVE BOX
Update:    Got a call from Dr. Heidy Marques, advising to discharge the patient home. She suggested that patient can be discharged home but needs to stop taking HCTZ-Losartan pill till Friday 3/10/17. He can resume the medicine on 3/10/17 in its original dose. He has been further instructed to drink plenty of fluids per Dr. Garza.    Thanks    Rubi Freeman MD  Ortho

## 2017-03-06 NOTE — PROGRESS NOTE ADULT - PROBLEM SELECTOR PROBLEM 4
Primary osteoarthritis of both knees

## 2017-03-08 DIAGNOSIS — E78.5 HYPERLIPIDEMIA, UNSPECIFIED: ICD-10-CM

## 2017-03-08 DIAGNOSIS — M17.32 UNILATERAL POST-TRAUMATIC OSTEOARTHRITIS, LEFT KNEE: ICD-10-CM

## 2017-03-08 DIAGNOSIS — E87.1 HYPO-OSMOLALITY AND HYPONATREMIA: ICD-10-CM

## 2017-03-08 DIAGNOSIS — E66.9 OBESITY, UNSPECIFIED: ICD-10-CM

## 2017-03-08 DIAGNOSIS — Z96.651 PRESENCE OF RIGHT ARTIFICIAL KNEE JOINT: ICD-10-CM

## 2017-03-08 DIAGNOSIS — E11.65 TYPE 2 DIABETES MELLITUS WITH HYPERGLYCEMIA: ICD-10-CM

## 2017-03-08 DIAGNOSIS — M25.562 PAIN IN LEFT KNEE: ICD-10-CM

## 2017-03-08 DIAGNOSIS — I10 ESSENTIAL (PRIMARY) HYPERTENSION: ICD-10-CM

## 2017-03-12 PROCEDURE — 80048 BASIC METABOLIC PNL TOTAL CA: CPT

## 2017-03-12 PROCEDURE — 83036 HEMOGLOBIN GLYCOSYLATED A1C: CPT

## 2017-03-12 PROCEDURE — C1713: CPT

## 2017-03-12 PROCEDURE — 86850 RBC ANTIBODY SCREEN: CPT

## 2017-03-12 PROCEDURE — 83935 ASSAY OF URINE OSMOLALITY: CPT

## 2017-03-12 PROCEDURE — 85027 COMPLETE CBC AUTOMATED: CPT

## 2017-03-12 PROCEDURE — 97116 GAIT TRAINING THERAPY: CPT

## 2017-03-12 PROCEDURE — 84540 ASSAY OF URINE/UREA-N: CPT

## 2017-03-12 PROCEDURE — 97530 THERAPEUTIC ACTIVITIES: CPT

## 2017-03-12 PROCEDURE — C1776: CPT

## 2017-03-12 PROCEDURE — 86900 BLOOD TYPING SEROLOGIC ABO: CPT

## 2017-03-12 PROCEDURE — 73560 X-RAY EXAM OF KNEE 1 OR 2: CPT

## 2017-03-12 PROCEDURE — 36415 COLL VENOUS BLD VENIPUNCTURE: CPT

## 2017-03-12 PROCEDURE — 86901 BLOOD TYPING SEROLOGIC RH(D): CPT

## 2017-03-12 PROCEDURE — 97161 PT EVAL LOW COMPLEX 20 MIN: CPT

## 2017-03-12 PROCEDURE — 88300 SURGICAL PATH GROSS: CPT

## 2018-08-25 NOTE — PROGRESS NOTE ADULT - PROBLEM SELECTOR PLAN 2
improved and will continue to normalize as glc improves
RN

## 2018-09-30 NOTE — DISCHARGE NOTE ADULT - MEDICATION SUMMARY - MEDICATIONS TO CHANGE
no known allergies
I will SWITCH the dose or number of times a day I take the medications listed below when I get home from the hospital:  None

## 2023-04-05 NOTE — PROGRESS NOTE ADULT - PROVIDER SPECIALTY LIST ADULT
Orthopedics Nasal Turnover Hinge Flap Text: The defect edges were debeveled with a #15 scalpel blade.  Given the size, depth, location of the defect and the defect being full thickness a nasal turnover hinge flap was deemed most appropriate.  Using a sterile surgical marker, an appropriate hinge flap was drawn incorporating the defect. The area thus outlined was incised with a #15 scalpel blade. The flap was designed to recreate the nasal mucosal lining and the alar rim. The skin margins were undermined to an appropriate distance in all directions utilizing iris scissors.